# Patient Record
Sex: MALE | Race: WHITE | NOT HISPANIC OR LATINO | Employment: FULL TIME | URBAN - METROPOLITAN AREA
[De-identification: names, ages, dates, MRNs, and addresses within clinical notes are randomized per-mention and may not be internally consistent; named-entity substitution may affect disease eponyms.]

---

## 2022-04-28 ENCOUNTER — CLINICAL SUPPORT (OUTPATIENT)
Dept: NUTRITION | Facility: HOSPITAL | Age: 47
End: 2022-04-28
Payer: COMMERCIAL

## 2022-04-28 VITALS — WEIGHT: 181.8 LBS

## 2022-04-28 DIAGNOSIS — K76.0 FATTY LIVER: ICD-10-CM

## 2022-04-28 PROCEDURE — 97802 MEDICAL NUTRITION INDIV IN: CPT

## 2022-04-28 NOTE — PROGRESS NOTES
Initial Nutrition Assessment Form    Patient Name: Abbey Goldmann    YOB: 1975    Sex: Male     Assessment Date: 4/28/2022  Start Time: 10 Stop Time: 11 Total Minutes: 60     Data:  Present at session: self   Parent/Patient Concerns: "I have a fatty liver and had abdominal pain, I am on steroids"   Medical Dx/Reason for Referral: Fatty liver disease   No past medical history on file  n/a   No current outpatient medications on file  No current facility-administered medications for this visit  Additional Meds/Supplements: Fish oil mvi Vit D   Special Learning Needs: n/a   Height: HC Readings from Last 5 Encounters:   No data found for HC      Weight: Wt Readings from Last 10 Encounters:   No data found for Wt     There is no height or weight on file to calculate BMI  Recent Weight Change: [x]Yes     []No  Amount:       Energy Needs: 2062cals (25cals/kg)   Not on File    Social History     Substance and Sexual Activity   Alcohol Use Not on file    NKFA   Social History     Tobacco Use   Smoking Status Not on file   Smokeless Tobacco Not on file       Who shops? patient   Who cooks? patient   Exercise: See below   Prior Counseling?  []Yes     [x]No  When:      Why:         Diet Hx:  Works at [employer]: [id number]  Never skips- oatmeal x2  or grits x2 water and/or coffee- flavored creamer   Lunch: 7-8pm- precooked chicken boneless chicken wings, chicken nuggets, x7-8 with [place] blend with 2c potatoes x2c in airfryer (enough for 4 days) frozen pizza         Dinner: 1230-1am- fruit /frozen fruit in bag x2c or oatmeal maybe slice of bread with peanut butter         Snacks: AM -   PM - 4-5pm pk of crackers x4-6 pack peanut butter fruit- water or lemon water  HS -         Nutrition Diagnosis:   Altered Nutrition-Related Laboratory values  related to Kidney, liver, cardiac, endocrine, neurologic, and/or pulmonary dysfunction as evidenced by  Increased AST, ALT, T  bili, serum ammonia (liver disorders)       Medical Nutrition Therapy Intervention:  [x]Individualized Meal Plan- discussed importance of small regular meals, front loading calories, having fewer calories int he evening before bed, portions pert plate method, regular consistent calories daily []Understanding Lab Values   []Basic Pathophysiology of Disease []Food/Medication Interactions   []Food Diary [x]Exercise-elliptical 30-45mins 3-4x/wk (not last 2 weeks r/t abd pain)   [x]Lifestyle/Behavior Modification Techniques-adequate sleep 7-9 hours as able, discussed importance of consistent routine and and it's effect on weight and overall health []Medication, Mechanism of Action   []Label Reading []Self Blood Glucose Monitoring   [x]Weight/BMI [id number]'s long term goal short term goal 2-4# by one month []Other -    Other Notes/Assessment:  Lev Harrison is here to lose weight and take care of his fatty liver, he is single and 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 and is a self proclaimed late eater and is awake until 3am   His Aunt is an ER nurse at [employer]  He is a  at [employer]  Typically will go to sleep at 3am and waking up at 10-11am, he does sleep solid through the night  He does not take any naps  He follows the same routine on the weekends  Comprehension: []Excellent  []Very Good  [x]Good  []Fair   []Poor    Receptivity: []Excellent  []Very Good  [x]Good  []Fair   []Poor    Expected Compliance: []Excellent  []Very Good  [x]Good  []Fair   []Poor        Goals:  1   3meals daily no skipping   2  Portions per plate method as discussed with RD   3  No follow-ups on file    Labs:  CMP  No results found for: NA, K, CL, CO2, ANIONGAP, BUN, CREATININE, GLUCOSE, GLUF, CALCIUM, CORRECTEDCA, AST, ALT, ALKPHOS, PROT, BILITOT, EGFR    BMP  No results found for: GLUCOSE, CALCIUM, NA, K, CO2, CL, BUN, CREATININE    Lipids  No results found for: CHOL  No results found for: HDL  No results found for: [address]  No results found for: TRIG  No results found for: CHOLHDL    Hemoglobin A1C  No results found for: HGBA1C    Fasting Glucose  No results found for: GLUF    Insulin     Thyroid  No results found for: TSH, U4SYCGC, X7PVEZV, THYROIDAB    Hepatic Function Panel  No results found for: ALT, AST, GGT, ALKPHOS, BILITOT    Celiac Disease Antibody Panel  No results found for: ENDOMYSIAL IGA, GLIADIN IGA, GLIADIN IGG, IGA, TISSUE TRANSGLUT AB, TTG IGA   Iron  No results found for: IRON, TIBC, FERRITIN         DSalmon MS RD LDN  [address] [facility]  [address]

## 2022-10-17 ENCOUNTER — OFFICE VISIT (OUTPATIENT)
Dept: PODIATRY | Facility: CLINIC | Age: 47
End: 2022-10-17
Payer: COMMERCIAL

## 2022-10-17 VITALS — DIASTOLIC BLOOD PRESSURE: 93 MMHG | WEIGHT: 189.4 LBS | HEART RATE: 81 BPM | SYSTOLIC BLOOD PRESSURE: 144 MMHG

## 2022-10-17 DIAGNOSIS — M79.672 LEFT FOOT PAIN: ICD-10-CM

## 2022-10-17 DIAGNOSIS — M79.671 RIGHT FOOT PAIN: ICD-10-CM

## 2022-10-17 DIAGNOSIS — L84 CORNS: Primary | ICD-10-CM

## 2022-10-17 PROCEDURE — 99202 OFFICE O/P NEW SF 15 MIN: CPT | Performed by: PODIATRIST

## 2022-10-17 NOTE — PROGRESS NOTES
Assessment/Plan:    Explained the patient that he has painful hyperkeratotic lesions beneath each 5th metatarsal head  Treatment consisted of lesion trimming  Periodic palliative care will likely be necessary  We will look into insurance coverage for accommodative orthotics  No problem-specific Assessment & Plan notes found for this encounter  Diagnoses and all orders for this visit:    [address]; Future    Right foot pain  -     Device Prior Authorization; Future    Left foot pain  -     Device Prior Authorization; Future          Subjective:      Patient ID: Mojgan Diaz is a 48 y o  male  HPI     Patient, a 77-year-old male presents with severe pain due to calluses on the soles of both feet  Lesions have been present for years  This is the 1st time according to the patient that he has gone for care  He has pain with each step  Patient is employed as a   The following portions of the patient's history were reviewed and updated as appropriate: allergies, current medications, past family history, past medical history, past social history, past surgical history and problem list     Review of Systems   Gastrointestinal: Negative  Musculoskeletal: Negative  Neurological: Negative  Objective:      /93   Pulse 81   Wt 85 9 kg (189 lb 6 4 oz)          Physical Exam  Constitutional:       Appearance: Normal appearance  Cardiovascular:      Pulses: Normal pulses  Musculoskeletal:         General: Normal range of motion  Skin:     Findings: Lesion present        Comments: IPK sub 5th metatarsal head bilateral

## 2022-11-03 ENCOUNTER — TELEPHONE (OUTPATIENT)
Dept: PODIATRY | Facility: CLINIC | Age: 47
End: 2022-11-03

## 2022-11-03 NOTE — TELEPHONE ENCOUNTER
Called patient, left message to let them know that we will be looking into insurance in the upcoming year

## 2022-11-17 ENCOUNTER — TELEPHONE (OUTPATIENT)
Dept: PODIATRY | Facility: CLINIC | Age: 47
End: 2022-11-17

## 2022-11-17 NOTE — TELEPHONE ENCOUNTER
Caller: patient    Doctor: Tammy Machado    Reason for call:Mary Chery please call him back    Call back#: [phone]

## 2023-01-17 ENCOUNTER — TELEPHONE (OUTPATIENT)
Dept: PODIATRY | Facility: CLINIC | Age: 48
End: 2023-01-17

## 2023-01-17 NOTE — TELEPHONE ENCOUNTER
LMOM to explain the insurance benefit information I received and also explain and offer self-pay option  Requested a return call with his decision

## 2023-01-24 NOTE — TELEPHONE ENCOUNTER
Spoke to patient who states that he just started a new job and he requested to get back to me with his decision about the self-pay option by the end of this week  I verbalized understanding to him  Referral deferred until 1/31/23

## 2023-03-28 ENCOUNTER — OFFICE VISIT (OUTPATIENT)
Dept: PODIATRY | Facility: CLINIC | Age: 48
End: 2023-03-28

## 2023-03-28 VITALS — SYSTOLIC BLOOD PRESSURE: 149 MMHG | DIASTOLIC BLOOD PRESSURE: 84 MMHG | HEART RATE: 101 BPM | WEIGHT: 182.6 LBS

## 2023-03-28 DIAGNOSIS — M79.671 RIGHT FOOT PAIN: ICD-10-CM

## 2023-03-28 DIAGNOSIS — M79.672 LEFT FOOT PAIN: ICD-10-CM

## 2023-03-28 DIAGNOSIS — L84 CORNS: Primary | ICD-10-CM

## 2023-03-28 NOTE — PROGRESS NOTES
Patient presents with recurrence of pain in each foot due to hyperkeratotic lesions beneath each fifth metatarsal head  Patient had relief for approximately 10 to 12 weeks with palliative care  Treatment consisted of lesion trimming  Patient is not interested in fifth metatarsal head resection at this time to treat the problem so periodic care is advised  He is rescheduled in 3 months

## 2023-06-27 ENCOUNTER — OFFICE VISIT (OUTPATIENT)
Dept: PODIATRY | Facility: CLINIC | Age: 48
End: 2023-06-27
Payer: COMMERCIAL

## 2023-06-27 VITALS
HEART RATE: 82 BPM | DIASTOLIC BLOOD PRESSURE: 98 MMHG | WEIGHT: 185 LBS | BODY MASS INDEX: 29.73 KG/M2 | RESPIRATION RATE: 19 BRPM | SYSTOLIC BLOOD PRESSURE: 151 MMHG | HEIGHT: 66 IN

## 2023-06-27 DIAGNOSIS — M79.672 LEFT FOOT PAIN: ICD-10-CM

## 2023-06-27 DIAGNOSIS — L84 CORNS: Primary | ICD-10-CM

## 2023-06-27 DIAGNOSIS — M79.671 RIGHT FOOT PAIN: ICD-10-CM

## 2023-06-27 PROCEDURE — 99212 OFFICE O/P EST SF 10 MIN: CPT | Performed by: PODIATRIST

## 2023-06-27 NOTE — PROGRESS NOTES
Patient presents with recurrence of bilateral foot pain due to hyperkeratotic lesions beneath each fifth metatarsal head  Treatment consists of lesion trimming  Patient states that he is much more comfortable with this periodic palliative care  He is rescheduled in 10 weeks

## 2023-09-05 ENCOUNTER — OFFICE VISIT (OUTPATIENT)
Dept: PODIATRY | Facility: CLINIC | Age: 48
End: 2023-09-05
Payer: COMMERCIAL

## 2023-09-05 VITALS
HEART RATE: 80 BPM | SYSTOLIC BLOOD PRESSURE: 149 MMHG | DIASTOLIC BLOOD PRESSURE: 95 MMHG | WEIGHT: 185 LBS | BODY MASS INDEX: 29.73 KG/M2 | RESPIRATION RATE: 18 BRPM | HEIGHT: 66 IN

## 2023-09-05 DIAGNOSIS — M79.671 RIGHT FOOT PAIN: ICD-10-CM

## 2023-09-05 DIAGNOSIS — M79.672 LEFT FOOT PAIN: ICD-10-CM

## 2023-09-05 DIAGNOSIS — L84 CORNS: Primary | ICD-10-CM

## 2023-09-05 PROCEDURE — 99212 OFFICE O/P EST SF 10 MIN: CPT | Performed by: PODIATRIST

## 2023-09-05 NOTE — PROGRESS NOTES
Patient presents for recurrence of bilateral foot pain secondary to hyperkeratotic lesions beneath each fifth metatarsal head. Treatment consisted of lesion trimming. Pedal pulses are within normal limits. Superficial bleeding left foot dressed with bacitracin. Patient has hypertrophy of the right fifth metatarsal base which is intermittently painful. No pain today. Recommended Voltaren gel if necessary. Cortisone injection also a treatment option if pain warrants. Patient is rescheduled in 2 months.

## 2023-11-09 ENCOUNTER — OFFICE VISIT (OUTPATIENT)
Dept: PODIATRY | Facility: CLINIC | Age: 48
End: 2023-11-09
Payer: COMMERCIAL

## 2023-11-09 VITALS
DIASTOLIC BLOOD PRESSURE: 85 MMHG | WEIGHT: 184 LBS | SYSTOLIC BLOOD PRESSURE: 139 MMHG | HEIGHT: 66 IN | RESPIRATION RATE: 18 BRPM | BODY MASS INDEX: 29.57 KG/M2 | HEART RATE: 94 BPM

## 2023-11-09 DIAGNOSIS — L84 CORNS: Primary | ICD-10-CM

## 2023-11-09 DIAGNOSIS — M79.672 LEFT FOOT PAIN: ICD-10-CM

## 2023-11-09 DIAGNOSIS — M79.671 RIGHT FOOT PAIN: ICD-10-CM

## 2023-11-09 PROCEDURE — 99212 OFFICE O/P EST SF 10 MIN: CPT | Performed by: PODIATRIST

## 2023-11-09 NOTE — PROGRESS NOTES
Patient presents with continued presence of punctate hyperkeratotic lesions beneath each fifth metatarsal head. They are not as painful as they had been as he is responding well to periodic care. Treatment consisted of lesion trimming. Patient to be rescheduled in 10 weeks.

## 2024-01-18 ENCOUNTER — OFFICE VISIT (OUTPATIENT)
Dept: PODIATRY | Facility: CLINIC | Age: 49
End: 2024-01-18
Payer: COMMERCIAL

## 2024-01-18 VITALS
HEIGHT: 66 IN | RESPIRATION RATE: 18 BRPM | WEIGHT: 184 LBS | BODY MASS INDEX: 29.57 KG/M2 | SYSTOLIC BLOOD PRESSURE: 132 MMHG | DIASTOLIC BLOOD PRESSURE: 88 MMHG | HEART RATE: 86 BPM

## 2024-01-18 DIAGNOSIS — L84 CORNS: Primary | ICD-10-CM

## 2024-01-18 DIAGNOSIS — M79.672 LEFT FOOT PAIN: ICD-10-CM

## 2024-01-18 PROCEDURE — 99212 OFFICE O/P EST SF 10 MIN: CPT | Performed by: PODIATRIST

## 2024-01-18 NOTE — PROGRESS NOTES
Patient again presents with painful hyperkeratotic lesions primarily be needed for the left fifth metatarsal head.  Right foot pain and presence of lesion is minimal.    Treatment consisted of lesion trimming.  This periodic palliation has been very helpful and patient is much more comfortable than he had been.  He is rescheduled in 10 weeks.

## 2024-05-14 ENCOUNTER — OFFICE VISIT (OUTPATIENT)
Dept: PODIATRY | Facility: CLINIC | Age: 49
End: 2024-05-14
Payer: COMMERCIAL

## 2024-05-14 VITALS
DIASTOLIC BLOOD PRESSURE: 74 MMHG | HEIGHT: 66 IN | BODY MASS INDEX: 29.7 KG/M2 | SYSTOLIC BLOOD PRESSURE: 113 MMHG | RESPIRATION RATE: 18 BRPM

## 2024-05-14 DIAGNOSIS — L84 CORNS: Primary | ICD-10-CM

## 2024-05-14 DIAGNOSIS — M79.672 LEFT FOOT PAIN: ICD-10-CM

## 2024-05-14 PROCEDURE — 99212 OFFICE O/P EST SF 10 MIN: CPT | Performed by: PODIATRIST

## 2024-05-14 NOTE — PROGRESS NOTES
Patient presents with recurrence of pain due to punctate hyperkeratotic lesion with faint hyperkeratotic lesion beneath left fifth metatarsal head.  Palliative care has proven very helpful.  Treatment consisted of lesion trimming.  Reappoint 3 months.

## 2024-08-13 ENCOUNTER — OFFICE VISIT (OUTPATIENT)
Dept: PODIATRY | Facility: CLINIC | Age: 49
End: 2024-08-13
Payer: COMMERCIAL

## 2024-08-13 VITALS
BODY MASS INDEX: 27.32 KG/M2 | HEIGHT: 66 IN | RESPIRATION RATE: 18 BRPM | SYSTOLIC BLOOD PRESSURE: 141 MMHG | HEART RATE: 71 BPM | WEIGHT: 170 LBS | DIASTOLIC BLOOD PRESSURE: 86 MMHG

## 2024-08-13 DIAGNOSIS — M79.672 LEFT FOOT PAIN: ICD-10-CM

## 2024-08-13 DIAGNOSIS — L85.2 KERATODERMA PUNCTATA: Primary | ICD-10-CM

## 2024-08-13 PROCEDURE — 99212 OFFICE O/P EST SF 10 MIN: CPT | Performed by: PODIATRIST

## 2024-08-13 NOTE — PROGRESS NOTES
Patient presents for assessment of left foot.  Patient has punctate hyperkeratotic lesions within the callus at the left fifth metatarsal head.  Palliative care improved the situation significantly and pain is minimal.  Treatment consisted of lesion trimming.  Reappoint as needed.

## 2025-04-21 ENCOUNTER — OFFICE VISIT (OUTPATIENT)
Dept: PODIATRY | Facility: CLINIC | Age: 50
End: 2025-04-21
Payer: COMMERCIAL

## 2025-04-21 VITALS — BODY MASS INDEX: 28.12 KG/M2 | RESPIRATION RATE: 18 BRPM | WEIGHT: 175 LBS | HEIGHT: 66 IN

## 2025-04-21 DIAGNOSIS — L85.2 KERATODERMA PUNCTATA: Primary | ICD-10-CM

## 2025-04-21 DIAGNOSIS — M79.672 LEFT FOOT PAIN: ICD-10-CM

## 2025-04-21 PROCEDURE — 99212 OFFICE O/P EST SF 10 MIN: CPT | Performed by: PODIATRIST

## 2025-04-21 NOTE — PROGRESS NOTES
Patient presents with severe pain secondary to punctate hyperkeratotic lesions at fifth metatarsal head left foot.  This is similar to what he was treated for in the 2024.  Patient responded well to palliative care and pain only recurred in the last 2 months.    Treatment consisted of trimming of hyperkeratosis from the punctate lesions at the plantar aspect of the left fifth metatarsal head.  This was followed by accommodative padding.  Reappoint 6 months.